# Patient Record
Sex: MALE | Race: WHITE | URBAN - METROPOLITAN AREA
[De-identification: names, ages, dates, MRNs, and addresses within clinical notes are randomized per-mention and may not be internally consistent; named-entity substitution may affect disease eponyms.]

---

## 2020-04-30 ENCOUNTER — TELEMEDICINE CONVERTED (OUTPATIENT)
Dept: FAMILY MEDICINE CLINIC | Facility: CLINIC | Age: 50
End: 2020-04-30
Attending: NURSE PRACTITIONER

## 2020-06-05 ENCOUNTER — TELEMEDICINE CONVERTED (OUTPATIENT)
Dept: FAMILY MEDICINE CLINIC | Facility: CLINIC | Age: 50
End: 2020-06-05
Attending: NURSE PRACTITIONER

## 2020-06-08 ENCOUNTER — CONVERSION ENCOUNTER (OUTPATIENT)
Dept: FAMILY MEDICINE CLINIC | Facility: CLINIC | Age: 50
End: 2020-06-08

## 2020-06-08 ENCOUNTER — OFFICE VISIT CONVERTED (OUTPATIENT)
Dept: FAMILY MEDICINE CLINIC | Facility: CLINIC | Age: 50
End: 2020-06-08
Attending: NURSE PRACTITIONER

## 2020-07-16 ENCOUNTER — HOSPITAL ENCOUNTER (OUTPATIENT)
Dept: GENERAL RADIOLOGY | Facility: HOSPITAL | Age: 50
Discharge: HOME | End: 2020-07-16
Attending: NURSE PRACTITIONER

## 2020-07-30 ENCOUNTER — OFFICE VISIT CONVERTED (OUTPATIENT)
Dept: FAMILY MEDICINE CLINIC | Facility: CLINIC | Age: 50
End: 2020-07-30
Attending: NURSE PRACTITIONER

## 2020-07-30 ENCOUNTER — CONVERSION ENCOUNTER (OUTPATIENT)
Dept: FAMILY MEDICINE CLINIC | Facility: CLINIC | Age: 50
End: 2020-07-30

## 2020-08-07 ENCOUNTER — CONVERSION ENCOUNTER (OUTPATIENT)
Dept: ORTHOPEDIC SURGERY | Facility: CLINIC | Age: 50
End: 2020-08-07

## 2020-08-07 ENCOUNTER — OFFICE VISIT CONVERTED (OUTPATIENT)
Dept: ORTHOPEDIC SURGERY | Facility: CLINIC | Age: 50
End: 2020-08-07
Attending: ORTHOPAEDIC SURGERY

## 2020-09-03 ENCOUNTER — OFFICE VISIT CONVERTED (OUTPATIENT)
Dept: FAMILY MEDICINE CLINIC | Facility: CLINIC | Age: 50
End: 2020-09-03
Attending: NURSE PRACTITIONER

## 2020-09-03 ENCOUNTER — CONVERSION ENCOUNTER (OUTPATIENT)
Dept: FAMILY MEDICINE CLINIC | Facility: CLINIC | Age: 50
End: 2020-09-03

## 2020-09-21 ENCOUNTER — OFFICE VISIT CONVERTED (OUTPATIENT)
Dept: ORTHOPEDIC SURGERY | Facility: CLINIC | Age: 50
End: 2020-09-21
Attending: ORTHOPAEDIC SURGERY

## 2020-10-01 ENCOUNTER — CONVERSION ENCOUNTER (OUTPATIENT)
Dept: FAMILY MEDICINE CLINIC | Facility: CLINIC | Age: 50
End: 2020-10-01

## 2020-10-01 ENCOUNTER — OFFICE VISIT CONVERTED (OUTPATIENT)
Dept: FAMILY MEDICINE CLINIC | Facility: CLINIC | Age: 50
End: 2020-10-01
Attending: NURSE PRACTITIONER
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
## 2021-05-10 NOTE — H&P
History and Physical      Patient Name: Ken Dhaliwal   Patient ID: [id number]   Sex: Male   YOB: 1970    Primary Care Provider: Meena PINTO    Visit Date: August 7, 2020    Provider: Bigg Davila MD   Location: [facility]   Location Address: [address] [place] [address]  [id number]   Location Phone: [phone]          Chief Complaint  · Right Shoulder Pain      History Of Present Illness  Ken Dhaliwal is a 50 year old male who presents today for evaluation of his right shoulder. He was throwing cornhole a couple of months ago and felt a pop in his shoulder and sustained a rotator cuff tear at that time. He has had an MRI that revealed a full-thickness rotator cuff tear of the supraspinatus. He is in treatment right now for recent history of crystal meth use, and he feels really dedicated to getting off the meth, because he has lost everything he had before over it. He seems sincere about wanting to take care of himself now. 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 Patient is being seen at the request of BLAIR Weber.       Medication List  Claritin 10 mg oral tablet; diclofenac sodium 50 mg oral tablet,delayed release (DR/EC); omeprazole 20 mg oral capsule,delayed release(DR/EC)         Allergy List  PENICILLINS         Social History  Alcohol (Never); Exercises regularly; Single; Tobacco (Current every day)         Review of Systems  · Constitutional  o Denies  o : fever, chills, weight loss  · Cardiovascular  o Denies  o : chest pain, shortness of breath  · Gastrointestinal  o Denies  o : liver disease, heartburn, nausea, blood in stools  · Genitourinary  o Denies  o : painful urination, blood in urine  · Integument  o Denies  o : rash, itching  · Neurologic  o Denies  o : headache, weakness, loss of consciousness  · Musculoskeletal  o Denies  o : painful, swollen joints  · Psychiatric  o Denies  o : drug/alcohol addiction, anxiety,  "depression      Vitals  Date Time BP Position Site L\R Cuff Size HR RR TEMP (F) WT  HT  BMI kg/m2 BSA m2 O2 Sat HC       08/07/2020 03:47 PM      72 - R   155lbs 16oz 5'  2\" 28.53 1.76 98 %          Physical Examination  · Constitutional  o Appearance  o : well developed, well-nourished, no obvious deformities present  · Head and Face  o Head  o :   § Inspection  § : normocephalic  o Face  o :   § Inspection  § : no facial lesions  · Eyes  o Conjunctivae  o : conjunctivae normal  o Sclerae  o : sclerae white  · Ears, Nose, Mouth and Throat  o Ears  o :   § External Ears  § : appearance within normal limits  § Hearing  § : intact  o Nose  o :   § External Nose  § : appearance normal  · Neck  o Inspection/Palpation  o : normal appearance  o Range of Motion  o : full range of motion  · Respiratory  o Respiratory Effort  o : breathing unlabored  o Inspection of Chest  o : normal appearance  o Auscultation of Lungs  o : no audible wheezing or rales  · Cardiovascular  o Heart  o : regular rate  · Gastrointestinal  o Abdominal Examination  o : soft and non-tender  · Skin and Subcutaneous Tissue  o General Inspection  o : intact, no rashes  · Psychiatric  o General  o : Alert and oriented x3  o Judgement and Insight  o : judgment and insight intact  o Mood and Affect  o : mood normal, affect appropriate  · Right Shoulder  o Inspection  o : Appearance of his shoulder is normal compared to the other side. No atrophy or skin discoloration. Full range of motion. Tenderness over the anterior distal supraspinatus rotator cuff insertion. Weakness with external rotation and empty can testing. Good strength of deltoid, biceps, triceps, wrist extensors, and wrist flexors. Sensation is grossly intact. Neurovascularly intact.   · Imaging  o Imaging  o : MRI performed at [facility] on 07/16/2020 revealed: 1) Full-thickness tear at the supraspinatus insertion which appears to involve the majority of the tendon " fibers; 2) Mild glenohumeral osteoarthritis with suspected chronic degeneration and tear of the anterior-inferior labrum; 3) Deformity of the distal clavicle, likely related to history of prior fracture. No definite acute fracture or malalignment is seen; 4) Infraspinatus tendinopathy without definite tear.           Assessment  · Primary osteoarthritis of right shoulder, AC joint and glenohumeral     715.11/M19.011  · Right shoulder pain, unspecified chronicity     719.41/M25.511  · Traumatic complete tear of right rotator cuff, initial encounter     840.4/S46.011A  · Impingement syndrome of shoulder, right     726.2/M75.41      Plan  · Medications  o Medications have been Reconciled  o Transition of Care or Provider Policy  · Instructions  o Reviewed the patient's Past Medical, Social, and Family history as well as the ROS at today's visit, no changes.  o Call or return if worsening symptoms.  o Discussed surgery.  o Risks and benefits discussed with the patient include, but are not limited to, infection, bleeding, death, blood clots, lung problems, heart attacks, possible damage to neurovascular structures, aesthetic deformity, and possible need for future surgeries, among others. Patient understands the risks and benefits, and wishes to proceed. Patient will follow up in the clinic postoperatively.   o Surgery pamphlet given.  o This note was transcribed by Radha plunkett.            Electronically Signed by: Radha Nixon-, Other -Author on August 10, 2020 04:17:39 PM  Electronically Co-signed by: Bigg Davila MD -Reviewer on August 10, 2020 10:05:02 PM

## 2021-05-12 NOTE — PROGRESS NOTES
Progress Note      Patient Name: Ken Dhaliwal   Patient ID: [id number]   Sex: Male   YOB: 1970        Visit Date: April 30, 2020    Provider: BLAIR Weber   Location: [facility]   Location Address: [address] [place] [address], [address]  [place], [place]  [id number]   Location Phone: [phone]          Chief Complaint  · NEW PATIENT ESTABLISH CARE      History Of Present Illness  Video Conferencing Visit  Ken Dhaliwal is a 49 year old male who is presenting for evaluation via video conferencing. Verbal consent obtained before beginning visit.   The following staff were present during this visit: Meena PINTO   Ken Dhaliwal is a 49 year old male who presents for evaluation and treatment of:      Patient is here today on messenger video to establish care. Reports R shoulder pain which feels like its grinding. States he cannot lift the arm more than 90 degrees due to pain. Admits injury 4-5 months ago when he was lifting a transmission out of a vehicle. States he felt a pop. He was prescribed diclofenac by an MD in [facility]. He is taking one tab bid. States the diclofenac helps with the pain-he is able to sleep on it now. Denies use of ice or heat.  Denies hx of shoulder surgery or any other surgeries.     He just moved to [place] for rehab. 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 He has been clean for 5 months. States he had been on it several yrs. Admits it is a daily struggle.     depression-he was prescribed Prozac by  3wks ago. He is unsure if it is helping. He has a follow up appt w/him next wk.     seasonal allergies-reports he takes Claritin for his allergy symptoms.    he is unsure about his vaccine status-admits he did have the Hep A vaccine 2/25/20 when he was incarcerated.     states he may move back home in July or Aug unless he can get an [employer] business started in [place].     25 mi spent w/pt via [employer]           Medication List  Name Date Started Instructions    Prozac 10 mg oral capsule  take 1 capsule (10 mg) by oral route once daily         Allergy List  Allergen Name Date Reaction Notes   PENICILLINS --  --  --          Social History  Finding Status Start/Stop Quantity Notes   Alcohol Never --/-- --  --    Tobacco Current every day 10/-- 0.5 ppd --          Immunizations  NameDate Admin Mfg Trade Name Lot Number Route Inj VIS Given VIS Publication   InfluenzaRefused 04/30/2020 NE Not Entered  NE NE     Comments:          Review of Systems  · Constitutional  o Denies  o : fever, fatigue, weight loss, weight gain  · Cardiovascular  o Denies  o : lower extremity edema, claudication, chest pressure, palpitations  · Respiratory  o Denies  o : shortness of breath, wheezing, cough, hemoptysis, dyspnea on exertion  · Gastrointestinal  o Denies  o : nausea, vomiting, diarrhea, constipation, abdominal pain  · Musculoskeletal  o Admits  o : limitation of motion, muscular weakness, shoulder pain      Physical Examination  · Constitutional  o Appearance  o : alert, in no acute distress, well developed, well-nourished  · Skin and Subcutaneous Tissue  o General Inspection  o : no rashes, normal skin color, warm and dry  · Neurologic  o Mental Status Examination  o : alert and oriented to time, place, and person. Gait and Station: normal gait, able to stand without difficulty  · Psychiatric  o Judgement and Insight  o : judgment and insight intact  o Mood and Affect  o : normal mood and affect  · Right Shoulder  o Inspection  o : no abnormalities, redness, swelling, scarring or atrophy  o Range of Motion  o : decreased range of motion-he is only able to lift 90 degrees.          Assessment  · Depression     311/F32.9   is managing his depression-he was prescribed Prozac 3wks ago. He has a follow up appt w/him next wk.   · Screening for lipid disorders     V77.91/Z13.220  · Screening for prostate cancer     V76.44/Z12.5  · Shoulder pain, right     719.41/M25.511  c/o right  shoulder pain x 4-5months. Admits injury w/pop. He is unable to lift the arm 90 degrees due to pain. He is taking diclofenac which is helping. He points to the bicep tendon location when asked where the pain is localized which is concerning for a tendon rupture. Ordered a MRI of the right shoulder.  · Weakness     780.79/R53.1  · Shoulder injury, initial encounter     959.2/S49.90XA  · Routine lab draw     V72.60/Z01.89  · Screening for thyroid disorder     V77.0/Z13.29  · Seasonal allergies     477.9/J30.2  allergy symptoms controlled w/claritin  · Drug addiction in remission     304.93/F19.21  He just moved to [place] for rehab. He has been in remission from meth x 5 months.      Plan  · Orders  o Male Physical Primary Care Panel (CMP, CBC, TSH, Lipid, PSA) [facility] (85329, 87933, 66017, 91036, 33017, ) - V76.44/Z12.5, V77.91/Z13.220, V72.60/Z01.89, V77.0/Z13.29 - 04/30/2020  o ACO-39: Current medications updated and reviewed () - - 04/30/2020  o ACO-14: Influenza immunization was not administered for reasons documented () - - 04/30/2020  o MRI shoulder right wo contrast (04214) - 719.41/M25.511, 780.79/R53.1, 959.2/S49.90XA - 04/30/2020  · Medications  o Medications have been Reconciled  o Transition of Care or Provider Policy  · Instructions  o Take all medications as prescribed/directed.  o Patient was educated/instructed on their diagnosis, treatment and medications prior to discharge from the clinic today.  o Patient instructed to seek medical attention urgently for new or worsening symptoms.  o Call the office with any concerns or questions.  o Bring all medicines with their bottles to each office visit.  o Flu vaccine declined.  o Discussed Covid-19 precautions including, but not limited to, social distancing, avoid touching your face, and hand washing.   o 1 month follow up  · Disposition  o Call or Return if symptoms worsen or persist.            Electronically Signed by: BLAIR Weber -Author  on May 1, 2020 04:44:49 PM

## 2021-05-13 NOTE — PROGRESS NOTES
Progress Note      Patient Name: Ken Dhaliwal   Patient ID: [id number]   Sex: Male   YOB: 1970    Primary Care Provider: Meena PINTO    Visit Date: June 5, 2020    Provider: BLAIR Weber   Location: [facility]   Location Address: [address] [place] [address], [address]  [place], [place]  [id number]   Location Phone: [phone]          Chief Complaint  · shoulder pain      History Of Present Illness  Video Conferencing Visit  Ken Dhaliwal is a 49 year old male who is presenting for evaluation via video conferencing via [employer]. Verbal consent obtained before beginning visit.   The following staff were present during this visit: Meena PINTO and Lin Rothman MA   Ken Dhaliwal is a 49 year old male who presents for evaluation and treatment of:      PT is C/O of right shoulder pain, he reports injuring it 4-5 months ago and is a 1/2 inch lower than his left arm. He was scheduled for a MRI last month but missed the appt due to being sent to [facility] and having his phone taken away, he needs it to be rescheduled. States it needs to be after 11:30am. States his pain has worsened since his last visit. Reports his right arm is about a half inch longer than the left. States it is popping/grinding w/any motion. He can only lift it 90 degrees. He points to the biceps tendon as the point of pain. re[ports his finger tips on the right hand are going numb started about 3 wks ago.    15 min spent via [employer] for appt.       Allergy List  Allergen Name Date Reaction Notes   PENICILLINS --  --  --          Social History  Finding Status Start/Stop Quantity Notes   Alcohol Never --/-- --  --    Tobacco Current every day 10/-- 0.5 ppd --          Immunizations  NameDate Admin Mfg Trade Name Lot Number Route Inj VIS Given VIS Publication   InfluenzaRefused 04/30/2020 NE Not Entered  NE NE     Comments:          Review of Systems  · Constitutional  o Denies  o : fever, fatigue,  weight loss, weight gain  · Cardiovascular  o Denies  o : lower extremity edema, claudication, chest pressure, palpitations  · Respiratory  o Denies  o : shortness of breath, wheezing, cough, hemoptysis, dyspnea on exertion  · Gastrointestinal  o Denies  o : nausea, vomiting, diarrhea, constipation, abdominal pain  · Musculoskeletal  o Admits  o : limitation of motion, muscular weakness, shoulder pain      Physical Examination  · Constitutional  o Appearance  o : alert, in no acute distress, well developed, well-nourished  · Skin and Subcutaneous Tissue  o General Inspection  o : no rashes , or lesions present, normal skin color, warm and dry  o Digits and Nails  o : no clubbing, cyanosis, deformities or edema present, normal appearing nails  · Neurologic  o Mental Status Examination  o :   § Orientation  § : grossly oriented to person, place and time  o Gait and Station  o : normal gait, able to stand without difficulty  · Psychiatric  o Judgement and Insight  o : judgment and insight intact  o Mood and Affect  o : normal mood and affect  · Right Shoulder  o Inspection  o : right arm longer than the left arm  o Range of Motion  o : decreased range of motion-can only lift the arm 90 degrees.  o Special Tests  o : negative Neers and positive Correa, Peel Back negative  · Left Shoulder  o Inspection  o : no abnormalities, redness, swelling, scarring or atrophy          Assessment  · Right shoulder pain     719.41/M25.511  c/o right shoulder pain x 5m which is progressively worsening. States the right arm is a 1/2 inch longer than his left arm. He was scheduled for a MRI last month but missed the appt due to being sent to [facility] and having his phone taken away. States it is popping/grinding w/any motion. He can only lift it 90 degrees. He points to the biceps tendon as the point of pain. Admits the finger tips of the right hand have been numb x 3 wks. Rescheduled MRI right shoulder.    Problems  Reconciled  Plan  · Orders  o ACO-39: Current medications updated and reviewed () - - 06/05/2020  o MRI shoulder right wo contrast (03978) - 719.41/M25.511 - 06/07/2020  · Medications  o Prozac 10 mg oral capsule   SIG: take 1 capsule (10 mg) by oral route once daily   DISP: (0) capsule with 0 refills  Discontinued on 06/05/2020     o Medications have been Reconciled  o Transition of Care or Provider Policy  · Instructions  o Patient was educated/instructed on their diagnosis, treatment and medications prior to discharge from the clinic today.  o Patient instructed to seek medical attention urgently for new or worsening symptoms.  o Call the office with any concerns or questions.  o Discussed Covid-19 precautions including, but not limited to, social distancing, avoid touching your face, and hand washing.   · Disposition  o Call or Return if symptoms worsen or persist.            Electronically Signed by: BLAIR Weber -Author on June 7, 2020 10:58:15 PM

## 2021-05-13 NOTE — PROGRESS NOTES
Progress Note      Patient Name: Ken Dhaliwal   Patient ID: [id number]   Sex: Male   YOB: 1970    Primary Care Provider: Meena PINTO    Visit Date: September 3, 2020    Provider: BLAIR Weber   Location: [facility]   Location Address: [address] [place] [address], [address]  [place], [place]  [id number]   Location Phone: [phone]          Chief Complaint  · SHOULDER SURGERY REFERRAL      History Of Present Illness  Ken Dhaliwal is a 50 year old male who presents for evaluation and treatment of:      Patient is here today for a referral for shoulder surgery in [place], [place] or [place], [place]. He moving on 23rd of this month. He has been staying at the [facility]  due to court order from intermediate r/t meth states he cannot stay there unless he is working to pay rent. He had a MRI on 7/20 which showed a full thickness tear at the supraspinatus insertion and labrum tear. He saw   and his is willing to perform the surgery stated he would be in a sling for 4 wks then in PT x 4m. States his time is up at the [facility] on the 23rd he does not know if they will let him stay there being on narcotics. He would like to have the surgery here if possible. He is going to talk to the [facility] about letting him stay there until after the surgery-discussed he could do home PT since he will not be able to drive after the surgery. Instructed pt it could be scheduled on discharge. States his ROM is getting worse and there is a constant burning sensation.    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       Past Medical History  Disease Name Date Onset Notes   ***No Significant Medical History --  --          Past Surgical History  Procedure Name Date Notes   I have had no surgeries --  --          Medication List  Name Date Started Instructions   Claritin 10 mg oral tablet  06/10/2020 take 1 tablet (10 mg) by oral route once daily   diclofenac sodium 50 mg oral tablet,delayed release (DR/EC) 06/10/2020 take 1 tablet (50 mg) by oral route 2 times per day for 30 days   omeprazole 20 mg oral capsule,delayed release(DR/) 06/10/2020 take 1 capsule (20 mg) by oral route once daily before a meal for 30 days         Allergy List  Allergen Name Date Reaction Notes   PENICILLINS --  --  --        Allergies Reconciled  Family Medical History  Disease Name Relative/Age Notes   Cancer, Unspecified Father/   Father   Family history of Arthritis Mother/   Mother         Social History  Finding Status Start/Stop Quantity Notes   Alcohol Never --/-- --  07/30/2020 -    Alcohol Use --  --/-- --  09/03/2020 - does not drink   Exercises regularly --  --/-- --  2 times every 2 weeks   lives alone --  --/-- --  --    Recreational Drug Use Former --/-- --  in the past   Single --  --/-- --  --    Single. --  --/-- --  --    Tobacco Current some day --/-- 0.5 PPD current some day smoker, 0.5 pack per day, smoked 31 or more years  stopped smoking on 5/31/2020, currently dips 1/2 can per day.   Unemployed. --  --/-- --  --          Immunizations  NameDate Admin Mfg Trade Name Lot Number Route Inj VIS Given VIS Publication   InfluenzaRefused 04/30/2020 NE Not Entered  NE NE     Comments:          Review of Systems  · Constitutional  o Denies  o : fever, fatigue, weight loss, weight gain  · Cardiovascular  o Denies  o : lower extremity edema, claudication, chest pressure, palpitations  · Respiratory  o Denies  o : shortness of breath, wheezing, cough, hemoptysis, dyspnea on exertion  · Gastrointestinal  o Denies  o : nausea, vomiting, diarrhea, constipation, abdominal pain  · Musculoskeletal  o Admits  o : limitation of motion, shoulder pain      Vitals  Date Time BP Position Site L\R Cuff Size HR RR TEMP (F) WT  HT  BMI kg/m2 BSA m2 O2 Sat HC       09/03/2020 03:16 /65 Sitting    76 - R   155lbs 2oz 5'  " 2\" 28.37 1.75 98 %          Physical Examination  · Constitutional  o Appearance  o : alert, in no acute distress, well developed, well-nourished  · Respiratory  o Auscultation of Lungs  o : normal breath sounds throughout, no wheeze, rhonchi, or crackles  · Cardiovascular  o Heart  o : Regular rate and rhythm, Normal S1,S2 , No cardiac murmers, No S3 or S4 gallop or rubs  · Skin and Subcutaneous Tissue  o General Inspection  o : no rashes, normal skin color, warm and dry  o Digits and Nails  o : no clubbing, cyanosis, deformities or edema present, normal appearing nails  · Neurologic  o Mental Status Examination  o : alert and oriented to time, place, and person. Gait and Station: normal gait, able to stand without difficulty  · Psychiatric  o Judgement and Insight  o : judgment and insight intact  o Mood and Affect  o : normal mood and affect  · Right Shoulder  o Inspection  o : no abnormalities, redness, swelling, scarring or atrophy  o Palpation  o : tender to palpation  o Range of Motion  o : decreased range of motion  o Strength  o : subscapularis, infraspinatus, supraspinatus, and biceps strength all weak  o Special Tests  o : positive Neers and Correa, positive drop arm test, Peel Back positive           Assessment  · Rotator cuff tear, right     840.4/M75.101  full thickness tear at supraspinatus insertion and labrum tear-discussed surgical options-recommended having [facility] perform the surgery and having home PT set up since he will be unable to drive. He is going to talk w/the [facility] to see if this is feasible.      Plan  · Orders  o ACO-39: Current medications updated and reviewed () - - 09/03/2020  o ACO-14: Influenza immunization was not administered for reasons documented () - - 09/03/2020  · Medications  o Medications have been Reconciled  o Transition of Care or Provider Policy  · Instructions  o Take all medications as prescribed/directed.  o Patient was educated/instructed on " their diagnosis, treatment and medications prior to discharge from the clinic today.  o Patient instructed to seek medical attention urgently for new or worsening symptoms.  o Call the office with any concerns or questions.  · Disposition  o Call or Return if symptoms worsen or persist.            Electronically Signed by: BLAIR Weber -Author on September 3, 2020 03:58:59 PM

## 2021-05-13 NOTE — PROGRESS NOTES
Progress Note      Patient Name: Ken Dhaliwal   Patient ID: [id number]   Sex: Male   YOB: 1970    Primary Care Provider: Meena PINTO    Visit Date: September 21, 2020    Provider: Bigg Davila MD   Location: [facility]   Location Address: [address] [place] [address]  [id number]   Location Phone: [phone]          Chief Complaint  · Right Shoulder Pain      History Of Present Illness  Ken Dhaliwal is a 50 year old male who presents today to [facility].      Patient presents today with a follow-up for right shoulder pain. Patient sustained initial injury by throwing cornhole a couple of months ago and felt a pop in his shoulder and sustained a rotator cuff tear at that time. He has had an MRI that revealed a full-thickness rotator cuff tear of the supraspinatus. He is in treatment right now for recent history of crystal meth use, and he feels really dedicated to getting off the meth, because he has lost everything he had before over it. Patient states that he is getting [facility]. Patient states that the pain in his shoulder has gotten worse since his last visit. Patient presents today to discuss surgical intervention. Patient has a 1 1/2 cm tear of the right RTC.              Past Medical History  ***No Significant Medical History         Past Surgical History  I have had no surgeries         Medication List  Claritin 10 mg oral tablet; diclofenac sodium 50 mg oral tablet,delayed release (DR/EC); omeprazole 20 mg oral capsule,delayed release(DR/EC)         Allergy List  PENICILLINS       Allergies Reconciled  Family Medical History  Cancer, Unspecified; Family history of Arthritis         Social History  Alcohol (Never); Alcohol Use; Exercises regularly; lives alone; Recreational Drug Use (Former); Single; Single.; Tobacco (Current some day); Unemployed.         Immunizations  Name Date Admin   Influenza Refused         Review of  "Systems  · Constitutional  o Denies  o : fever, chills, weight loss  · Cardiovascular  o Denies  o : chest pain, shortness of breath  · Gastrointestinal  o Denies  o : liver disease, heartburn, nausea, blood in stools  · Genitourinary  o Denies  o : painful urination, blood in urine  · Integument  o Denies  o : rash, itching  · Neurologic  o Denies  o : headache, weakness, loss of consciousness  · Musculoskeletal  o Denies  o : painful, swollen joints  · Psychiatric  o Denies  o : drug/alcohol addiction, anxiety, depression      Vitals  Date Time BP Position Site L\R Cuff Size HR RR TEMP (F) WT  HT  BMI kg/m2 BSA m2 O2 Sat        09/21/2020 01:38 PM      92 - R   155lbs 0oz 5'  2\" 28.35 1.75 98 %          Physical Examination  · Constitutional  o Appearance  o : well developed, well-nourished, no obvious deformities present  · Head and Face  o Head  o :   § Inspection  § : normocephalic  o Face  o :   § Inspection  § : no facial lesions  · Eyes  o Conjunctivae  o : conjunctivae normal  o Sclerae  o : sclerae white  · Ears, Nose, Mouth and Throat  o Ears  o :   § External Ears  § : appearance within normal limits  § Hearing  § : intact  o Nose  o :   § External Nose  § : appearance normal  · Neck  o Inspection/Palpation  o : normal appearance  o Range of Motion  o : full range of motion  · Respiratory  o Respiratory Effort  o : breathing unlabored  o Inspection of Chest  o : normal appearance  o Auscultation of Lungs  o : no audible wheezing or rales  · Cardiovascular  o Heart  o : regular rate  · Gastrointestinal  o Abdominal Examination  o : soft and non-tender  · Skin and Subcutaneous Tissue  o General Inspection  o : intact, no rashes  · Psychiatric  o General  o : Alert and oriented x3  o Judgement and Insight  o : judgment and insight intact  o Mood and Affect  o : mood normal, affect appropriate  · Right Shoulder  o Inspection  o : Sensation grossly intact. Neurovascular intact. Pulses normal. Patient " guarding his right shoulder. Tenderness over the anterior distal supraspinatus rotator cuff insertion. Weakness with external rotation and empty can testing. Good strength of deltoid, biceps, triceps, wrist extensors, and wrist flexors. No swelling, skin discoloration or atrophy. Limited ROM.   · Imaging  o Imaging  o : 7/16/2020 MRI: 1. Full-thickness tear at the supraspinatus insertion which appears to involve the majority of the tendon fibers. 2. Mild glenohumeral osteoarthritis with suspected chronic degeneration and tear of the anterior-inferior labrum. 3. Deformity of the distal clavicle, likely related to history of prior fracture. No definite acute fracture or malalignment is seen. 4. Infraspinatus tendinopathy without definite tear.               Assessment  · Right shoulder pain, unspecified chronicity     719.41/M25.511  · Rotator cuff tear, Right     840.4/M75.100      Plan  · Medications  o Medications have been Reconciled  o Transition of Care or Provider Policy  · Instructions  o Dr. Davila saw and examined the patient and agrees with plan.   o X-rays reviewed by Dr. Davila.  o Reviewed the patient's Past Medical, Social, and Family history as well as the ROS at today's visit, no changes.  o Call or return if worsening symptoms.  o Discussed surgery.  o Risks/benefits discussed with patient including, but not limited to: infection, bleeding, neurovascular damage, malunion, nonunion, aesthetic deformity, need for further surgery, and death.  o Surgery pamphlet given.  o Follow Up PRN.  o Follow up as needed.  o This note was transcribed by Taya Hawthorne. dimitrios  o Discussed diagnosis and treatment options with the patient. Discussed surgical intervention and the importance of aftercare. Discussed being referred to another Orthopedics doctor that's closer to his home. Patient opted to call back for surgery because he wants some information on the Ortho in [facility] [place].             Electronically Signed by:  Taya Hawthorne-, Other -Author on September 24, 2020 08:29:18 AM  Electronically Co-signed by: Bigg Davila MD -Reviewer on September 24, 2020 05:16:40 PM

## 2021-05-13 NOTE — PROGRESS NOTES
Progress Note      Patient Name: Ken Dhaliwal   Patient ID: [id number]   Sex: Male   YOB: 1970    Primary Care Provider: Meena PINTO    Visit Date: July 30, 2020    Provider: BLAIR Weber   Location: [facility]   Location Address: [address] [place] [address], [address]  [place], [place]  [id number]   Location Phone: [phone]          Chief Complaint  · F/U SHOULDER MRI      History Of Present Illness  Ken Dhaliwal is a 50 year old male who presents for evaluation and treatment of:      Patient is here today for a follow up on his R shoulder. Says he had MRI done on 07/16 and would like to know the results. He continues having difficulty raising his arm. States the right arm is longer than the left one. Admits weakness.       Medication List  Name Date Started Instructions   Claritin 10 mg oral tablet 06/10/2020 take 1 tablet (10 mg) by oral route once daily   diclofenac sodium 50 mg oral tablet,delayed release (DR/EC) 06/10/2020 take 1 tablet (50 mg) by oral route 2 times per day for 30 days   omeprazole 20 mg oral capsule,delayed release(DR/EC) 06/10/2020 take 1 capsule (20 mg) by oral route once daily before a meal for 30 days         Allergy List  Allergen Name Date Reaction Notes   PENICILLINS --  --  --        Allergies Reconciled  Social History  Finding Status Start/Stop Quantity Notes   Alcohol Never --/-- --  07/30/2020 -    Exercises regularly --  --/-- --  2 times every 2 weeks   Single --  --/-- --  --    Tobacco Current every day 10/-- 0.5 ppd stopped smoking on 5/31/2020, currently dips 1/2 can per day.         Immunizations  NameDate Admin Mfg Trade Name Lot Number Route Inj VIS Given VIS Publication   InfluenzaRefused 04/30/2020 NE Not Entered  NE NE     Comments:          Review of Systems  · Constitutional  o Denies  o : fever, fatigue, weight loss, weight gain  · Cardiovascular  o Denies  o : lower extremity edema, claudication, chest pressure,  "palpitations  · Respiratory  o Denies  o : shortness of breath, wheezing, cough, hemoptysis, dyspnea on exertion  · Gastrointestinal  o Denies  o : nausea, vomiting, diarrhea, constipation, abdominal pain  · Musculoskeletal  o Admits  o : limitation of motion, muscular weakness, shoulder pain      Vitals  Date Time BP Position Site L\R Cuff Size HR RR TEMP (F) WT  HT  BMI kg/m2 BSA m2 O2 Sat        07/30/2020 02:10 /55 Sitting    92 - R   156lbs 2oz 5'  2\" 28.56 1.76 98 %          Physical Examination  · Constitutional  o Appearance  o : alert, in no acute distress, well developed, well-nourished  · Respiratory  o Auscultation of Lungs  o : normal breath sounds throughout, no wheeze, rhonchi, or crackles  · Cardiovascular  o Heart  o : Regular rate and rhythm, Normal S1,S2 , No cardiac murmers, No S3 or S4 gallop or rubs  · Skin and Subcutaneous Tissue  o General Inspection  o : no rashes, normal skin color, warm and dry  o Digits and Nails  o : no clubbing, cyanosis, deformities or edema present, normal appearing nails  · Neurologic  o Mental Status Examination  o : alert and oriented to time, place, and person. Gait and Station: normal gait, able to stand without difficulty  · Psychiatric  o Judgement and Insight  o : judgment and insight intact  o Mood and Affect  o : normal mood and affect  · Right Shoulder  o Inspection  o : right arm longer than the left  o Palpation  o : tender to palpation  o Range of Motion  o : decreased range of motion  o Strength  o : subscapularis, infraspinatus, supraspinatus, and biceps strength all weak  o Stability  o : shoulder and rotator cuff stability intact          Assessment  · Shoulder pain, right     719.41/M25.511  · Muscle tear     848.9/T14.8XXA  reviewed MRI w/pt-full thickness tear at the supraspinatus insertion and suspected tear of the anterior-inferior labrium-consult placed for ortho.      Plan  · Orders  o ACO-39: Current medications updated and reviewed " () - - 07/30/2020  o ACO-14: Influenza immunization was not administered for reasons documented () - - 07/30/2020  o ORTHOPEDIC CONSULTATION (ORTHO) - 719.41/M25.511, 848.9/T14.8XXA - 07/30/2020  · Medications  o Medications have been Reconciled  o Transition of Care or Provider Policy  · Instructions  o Take all medications as prescribed/directed.  o Patient was educated/instructed on their diagnosis, treatment and medications prior to discharge from the clinic today.  o Patient instructed to seek medical attention urgently for new or worsening symptoms.  o Call the office with any concerns or questions.  o Discussed Covid-19 precautions including, but not limited to, social distancing, avoid touching your face, and hand washing.   o 3 month follow up  · Disposition  o Call or Return if symptoms worsen or persist.            Electronically Signed by: BLAIR Weber -Author on August 4, 2020 11:45:17 PM

## 2021-05-13 NOTE — PROGRESS NOTES
Progress Note      Patient Name: Ken Dhaliwal   Patient ID: [id number]   Sex: Male   YOB: 1970    Primary Care Provider: Meena PINTO    Visit Date: October 1, 2020    Provider: BLAIR Weber   [place] [facility]   Location Address: [address] [place] [address], [address]  [place]  [id number]   Location Phone: [phone]          Chief Complaint  · NEED NOTE      History Of Present Illness  Ken Dhaliwal is a 50 year old male who presents for evaluation and treatment of:      Patient is here today to get a note stating that he can not work due to needing shoulder surgery-he is staying at the [facility] and they will not let him stay there without working unless he has a note from his doctor. He gave them the paperwork from [facility] showing that he is in need of a right rotator cuff repair but states that is not sufficient. Reports he was court ordered to do another month at the [facility]-he plans of having surgery next month after he is discharged from the [facility]. He will be going back to [place] for surgery.    Requesting refill on Diclofenac.       Past Medical History  Disease Name Date Onset Notes   ***No Significant Medical History --  --          Past Surgical History  Procedure Name Date Notes   I have had no surgeries --  --          Medication List  Name Date Started Instructions   Claritin 10 mg oral tablet 06/10/2020 take 1 tablet (10 mg) by oral route once daily   diclofenac sodium 50 mg oral tablet,delayed release (DR/EC) 10/01/2020 take 1 tablet (50 mg) by oral route 2 times per day for 30 days   omeprazole 20 mg oral capsule,delayed release(DR/EC) 06/10/2020 take 1 capsule (20 mg) by oral route once daily before a meal for 30 days         Allergy List  Allergen Name Date Reaction Notes   PENICILLINS --  --  --          Family Medical History  Disease Name Relative/Age Notes   Cancer, Unspecified Father/   Father   Family history of  "Arthritis Mother/   Mother         Social History  Finding Status Start/Stop Quantity Notes   Alcohol Never --/-- --  10/01/2020 - 07/30/2020 -    Exercises regularly --  --/-- --  2 times every 2 weeks   lives alone --  --/-- --  --    Recreational Drug Use Former --/-- --  in the past   Single --  --/-- --  --    Tobacco Current some day --/-- 0.5 PPD current some day smoker, 0.5 pack per day, smoked 31 or more years  stopped smoking on 5/31/2020, currently dips 1/2 can per day.   Unemployed. --  --/-- --  --          Immunizations  NameDate Admin Mfg Trade Name Lot Number Route Inj VIS Given VIS Publication   InfluenzaRefused 04/30/2020 NE Not Entered  NE NE     Comments:          Review of Systems  · Constitutional  o Denies  o : fever, fatigue, weight loss, weight gain  · Cardiovascular  o Denies  o : lower extremity edema, claudication, chest pressure, palpitations  · Respiratory  o Denies  o : shortness of breath, wheezing, cough, hemoptysis, dyspnea on exertion  · Gastrointestinal  o Denies  o : nausea, vomiting, diarrhea, constipation, abdominal pain  · Musculoskeletal  o Admits  o : limitation of motion, muscular weakness, shoulder pain      Vitals  Date Time BP Position Site L\R Cuff Size HR RR TEMP (F) WT  HT  BMI kg/m2 BSA m2 O2 Sat FR L/min FiO2 HC       09/03/2020 03:16 /65 Sitting    76 - R   155lbs 2oz 5'  2\" 28.37 1.75 98 %      09/21/2020 01:38 PM      92 - R   155lbs 0oz 5'  2\" 28.35 1.75 98 %      10/01/2020 01:06 /68 Sitting    80 - R  98.2 158lbs 1oz 5'  2\" 28.91 1.77 98 %  21%          Physical Examination  · Constitutional  o Appearance  o : alert, in no acute distress, well developed, well-nourished  · Respiratory  o Auscultation of Lungs  o : normal breath sounds throughout, no wheeze, rhonchi, or crackles  · Cardiovascular  o Heart  o : Regular rate and rhythm, Normal S1,S2 , No cardiac murmers, No S3 or S4 gallop or rubs  · Skin and Subcutaneous Tissue  o General " Inspection  o : no rashes, normal skin color, warm and dry  o Digits and Nails  o : no clubbing, cyanosis, deformities or edema present, normal appearing nails  · Neurologic  o Mental Status Examination  o : alert and oriented to time, place, and person. Gait and Station: normal gait, able to stand without difficulty  · Psychiatric  o Judgement and Insight  o : judgment and insight intact  o Mood and Affect  o : normal mood and affect  · Right Shoulder  o Inspection  o : no abnormalities, redness, swelling, scarring or atrophy  o Palpation  o : tender to palpation  o Range of Motion  o : decreased range of motion  o Strength  o : subscapularis, infraspinatus, supraspinatus, and biceps strength all weak  o Special Tests  o : positive Neers and Correa, positive drop arm test, Peel Back positive   o Stability  o : shoulder and rotator cuff stability not intact          Assessment  · Rotator cuff tear, right     840.4/M75.101  MRI from 7/16/20 shows full thickness tear at supraspinatus insertion and appears to involve majority of tendon fivers, mild glenohumeral OA with suspected chronic degeneration and tear of anterior and inferior labrum, deformity distal clavicle likely prior fx, and Infraspinatus tendinopathy without definite tear. On exam he has very limited movement of the right arm-he can only lift it about 45 degrees. Strength very weak on the right arm as well. Positive Neer and Correa, drop arm test and peel back. Note given to be off work until after right rotator cuff repair. Refilled Diclofenac. Order given for sling.       Plan  · Orders  o ACO-39: Current medications updated and reviewed (, 1159F) - - 10/01/2020  o ACO-14: Influenza immunization was not administered for reasons documented [facility] () - - 10/01/2020  · Medications  o Arm sling   SIG: right arm sling DX Right Rotator Cuff Repair   DISP: (1) with 0 refills  Prescribed on 10/01/2020     o diclofenac sodium 50 mg oral tablet,delayed  release (/EC)   SIG: take 1 tablet (50 mg) by oral route 2 times per day for 30 days   DISP: (60) Tablet with 2 refills  Refilled on 10/01/2020     · Instructions  o Take all medications as prescribed/directed.  o Patient was educated/instructed on their diagnosis, treatment and medications prior to discharge from the clinic today.  o Patient instructed to seek medical attention urgently for new or worsening symptoms.  o Call the office with any concerns or questions.  o Electronically Identified Patient Education Materials Provided Electronically  · Disposition  o Call or Return if symptoms worsen or persist.            Electronically Signed by: Meena Garza APRN -Author on October 4, 2020 05:30:54 PM

## 2021-05-14 VITALS
SYSTOLIC BLOOD PRESSURE: 107 MMHG | WEIGHT: 155.12 LBS | BODY MASS INDEX: 28.54 KG/M2 | HEART RATE: 76 BPM | HEIGHT: 62 IN | OXYGEN SATURATION: 98 % | DIASTOLIC BLOOD PRESSURE: 65 MMHG

## 2021-05-14 VITALS
HEIGHT: 62 IN | WEIGHT: 158.06 LBS | OXYGEN SATURATION: 98 % | HEART RATE: 80 BPM | TEMPERATURE: 98.2 F | DIASTOLIC BLOOD PRESSURE: 68 MMHG | BODY MASS INDEX: 29.08 KG/M2 | SYSTOLIC BLOOD PRESSURE: 148 MMHG

## 2021-05-14 VITALS — OXYGEN SATURATION: 98 % | HEART RATE: 92 BPM | HEIGHT: 62 IN | WEIGHT: 155 LBS | BODY MASS INDEX: 28.52 KG/M2

## 2021-05-15 VITALS
HEIGHT: 62 IN | DIASTOLIC BLOOD PRESSURE: 55 MMHG | OXYGEN SATURATION: 98 % | HEART RATE: 92 BPM | BODY MASS INDEX: 28.73 KG/M2 | SYSTOLIC BLOOD PRESSURE: 119 MMHG | WEIGHT: 156.12 LBS

## 2021-05-15 VITALS
WEIGHT: 154.12 LBS | BODY MASS INDEX: 28.36 KG/M2 | HEART RATE: 88 BPM | OXYGEN SATURATION: 97 % | SYSTOLIC BLOOD PRESSURE: 139 MMHG | DIASTOLIC BLOOD PRESSURE: 71 MMHG | HEIGHT: 62 IN

## 2021-05-15 VITALS — WEIGHT: 156 LBS | HEIGHT: 62 IN | OXYGEN SATURATION: 98 % | HEART RATE: 72 BPM | BODY MASS INDEX: 28.71 KG/M2
